# Patient Record
Sex: FEMALE | URBAN - METROPOLITAN AREA
[De-identification: names, ages, dates, MRNs, and addresses within clinical notes are randomized per-mention and may not be internally consistent; named-entity substitution may affect disease eponyms.]

---

## 2020-05-01 ENCOUNTER — OFFICE VISIT - RIVER FALLS (OUTPATIENT)
Dept: FAMILY MEDICINE | Facility: CLINIC | Age: 65
End: 2020-05-01

## 2020-05-06 ENCOUNTER — AMBULATORY - RIVER FALLS (OUTPATIENT)
Dept: FAMILY MEDICINE | Facility: CLINIC | Age: 65
End: 2020-05-06

## 2020-05-07 ENCOUNTER — COMMUNICATION - RIVER FALLS (OUTPATIENT)
Dept: FAMILY MEDICINE | Facility: CLINIC | Age: 65
End: 2020-05-07

## 2020-05-07 LAB
A/G RATIO - HISTORICAL: 1.5 (ref 1–2.5)
ALBUMIN SERPL-MCNC: 4.3 GM/DL (ref 3.6–5.1)
ALP SERPL-CCNC: 44 UNIT/L (ref 37–153)
ALT SERPL W P-5'-P-CCNC: 14 UNIT/L (ref 6–29)
AST SERPL W P-5'-P-CCNC: 19 UNIT/L (ref 10–35)
BILIRUB SERPL-MCNC: 0.5 MG/DL (ref 0.2–1.2)
BUN SERPL-MCNC: 18 MG/DL (ref 7–25)
BUN/CREAT RATIO - HISTORICAL: ABNORMAL (ref 6–22)
CALCIUM SERPL-MCNC: 10 MG/DL (ref 8.6–10.4)
CHLORIDE BLD-SCNC: 106 MMOL/L (ref 98–110)
CHOLEST SERPL-MCNC: 230 MG/DL
CHOLEST/HDLC SERPL: 3.5 {RATIO}
CO2 SERPL-SCNC: 29 MMOL/L (ref 20–32)
CREAT SERPL-MCNC: 0.77 MG/DL (ref 0.5–0.99)
EGFRCR SERPLBLD CKD-EPI 2021: 83 ML/MIN/1.73M2
ERYTHROCYTE [DISTWIDTH] IN BLOOD BY AUTOMATED COUNT: 12.7 % (ref 11–15)
GLOBULIN: 2.8 (ref 1.9–3.7)
GLUCOSE BLD-MCNC: 113 MG/DL (ref 65–99)
HCT VFR BLD AUTO: 41.3 % (ref 35–45)
HDLC SERPL-MCNC: 65 MG/DL
HGB BLD-MCNC: 14.2 GM/DL (ref 11.7–15.5)
LDLC SERPL CALC-MCNC: 142 MG/DL
MCH RBC QN AUTO: 30.5 PG (ref 27–33)
MCHC RBC AUTO-ENTMCNC: 34.4 GM/DL (ref 32–36)
MCV RBC AUTO: 88.8 FL (ref 80–100)
NONHDLC SERPL-MCNC: 165 MG/DL
PLATELET # BLD AUTO: 309 10*3/UL (ref 140–400)
PMV BLD: 10.8 FL (ref 7.5–12.5)
POTASSIUM BLD-SCNC: 4.4 MMOL/L (ref 3.5–5.3)
PROT SERPL-MCNC: 7.1 GM/DL (ref 6.1–8.1)
RBC # BLD AUTO: 4.65 10*6/UL (ref 3.8–5.1)
SODIUM SERPL-SCNC: 140 MMOL/L (ref 135–146)
TRIGL SERPL-MCNC: 111 MG/DL
WBC # BLD AUTO: 7 10*3/UL (ref 3.8–10.8)

## 2020-05-18 ENCOUNTER — COMMUNICATION - RIVER FALLS (OUTPATIENT)
Dept: FAMILY MEDICINE | Facility: CLINIC | Age: 65
End: 2020-05-18

## 2020-06-02 ENCOUNTER — COMMUNICATION - RIVER FALLS (OUTPATIENT)
Dept: FAMILY MEDICINE | Facility: CLINIC | Age: 65
End: 2020-06-02

## 2020-06-03 ENCOUNTER — AMBULATORY - RIVER FALLS (OUTPATIENT)
Dept: FAMILY MEDICINE | Facility: CLINIC | Age: 65
End: 2020-06-03

## 2020-06-03 ENCOUNTER — COMMUNICATION - RIVER FALLS (OUTPATIENT)
Dept: FAMILY MEDICINE | Facility: CLINIC | Age: 65
End: 2020-06-03

## 2020-06-04 ENCOUNTER — AMBULATORY - RIVER FALLS (OUTPATIENT)
Dept: FAMILY MEDICINE | Facility: CLINIC | Age: 65
End: 2020-06-04

## 2020-07-06 ENCOUNTER — COMMUNICATION - RIVER FALLS (OUTPATIENT)
Dept: FAMILY MEDICINE | Facility: CLINIC | Age: 65
End: 2020-07-06

## 2020-07-07 ENCOUNTER — AMBULATORY - RIVER FALLS (OUTPATIENT)
Dept: FAMILY MEDICINE | Facility: CLINIC | Age: 65
End: 2020-07-07

## 2020-07-07 ENCOUNTER — COMMUNICATION - RIVER FALLS (OUTPATIENT)
Dept: FAMILY MEDICINE | Facility: CLINIC | Age: 65
End: 2020-07-07

## 2020-07-08 ENCOUNTER — COMMUNICATION - RIVER FALLS (OUTPATIENT)
Dept: FAMILY MEDICINE | Facility: CLINIC | Age: 65
End: 2020-07-08

## 2022-02-12 VITALS
WEIGHT: 137.2 LBS | HEIGHT: 68 IN | HEART RATE: 80 BPM | SYSTOLIC BLOOD PRESSURE: 126 MMHG | DIASTOLIC BLOOD PRESSURE: 68 MMHG | BODY MASS INDEX: 20.79 KG/M2

## 2022-02-16 NOTE — TELEPHONE ENCOUNTER
---------------------  From: Dagmar Caballero RN   Sent: 6/4/2020 2:45:02 PM CDT  Subject: Prolia REMS     Indication: Prolia  (denosumab) is a prescription medicine used to treat osteoporosis in patients who:   Are at high risk for fracture, meaning patients who have had a fracture related to osteoporosis, or who have multiple risk factors for fracture   Cannot use another osteoporosis medicine or other osteoporosis medicines did not work well   The timeline for early/late injections would be 4 weeks early and any time after the 6 month kelli. If a patient receives their injection late, then the subsequent injection would be 6 months from the date that they actually received the injection    1.  When was the last injection?  October 2019  2.  Did they check with their insurance for this calendar year?  Yes  3.  Is there an order in the chart?  Yes  4.  Has the patient had dental work involving the bone in the past month or will have work in the next 6 months?  No  5.  Did you have the patient wait 15 minutes after the injection?  Yes    The following steps were completed to comply with the REMS program for Prolia:  Reviewed information in the Medication Guide and Patient Counseling Chart, including the serious risks of Prolia  and the symptoms of each risk.  Advised patient to seek prompt medical attention if they have signs or symptoms of any of the serious risks.  Provided patient with a copy of the Medication Guide and Patient Brochure.

## 2022-02-16 NOTE — NURSING NOTE
Comprehensive Intake Entered On:  5/1/2020 8:36 AM CDT    Performed On:  5/1/2020 8:29 AM CDT by Elyssa Lopez MA               Summary   Chief Complaint :   Establish care, Needs medicaitons refilled, has been out of cholesterol medication for 1 month, is due this month for Prolia injection; Verbal permission for telephone visit   Elyssa Lopez MA - 5/1/2020 8:29 AM CDT   Health Status   Tobacco Use? :   Current every day smoker   Eylssa Lopez MA - 5/1/2020 8:29 AM CDT   Consents   Consent for Immunization Exchange :   Consent Granted   Consent for Immunizations to Providers :   Consent Granted   Elyssa Lopez MA - 5/1/2020 8:29 AM CDT   Meds / Allergies   (As Of: 5/1/2020 8:36:56 AM CDT)   Allergies (Active)   No Known Medication Allergies  Estimated Onset Date:   Unspecified ; Created By:   Elyssa Lopez MA; Reaction Status:   Active ; Category:   Drug ; Substance:   No Known Medication Allergies ; Type:   Allergy ; Updated By:   Elyssa Lopez MA; Reviewed Date:   5/1/2020 8:36 AM CDT        Medication List   (As Of: 5/1/2020 8:36:56 AM CDT)   Home Meds    denosumab  :   denosumab ; Status:   Documented ; Ordered As Mnemonic:   Prolia 60 mg/mL subcutaneous solution ; Simple Display Line:   60 mg, Subcutaneous, q6 mo, 0 Refill(s) ; Catalog Code:   denosumab ; Order Dt/Tm:   5/1/2020 8:36:32 AM CDT          calcium-vitamin D  :   calcium-vitamin D ; Status:   Documented ; Ordered As Mnemonic:   Citracal Maximum + D ; Simple Display Line:   Oral, bid, 0 Refill(s) ; Catalog Code:   calcium-vitamin D ; Order Dt/Tm:   5/1/2020 8:31:14 AM CDT          fluticasone/umeclidinium/vilanterol  :   fluticasone/umeclidinium/vilanterol ; Status:   Documented ; Ordered As Mnemonic:   Trelegy Ellipta inhalation powder ; Simple Display Line:   1 puff(s), Inhale, daily, 0 Refill(s) ; Catalog Code:   fluticasone/umeclidinium/vilanterol ; Order Dt/Tm:   5/1/2020 8:31:05 AM CDT          gabapentin  :   gabapentin ; Status:    Documented ; Ordered As Mnemonic:   gabapentin 300 mg oral capsule ; Simple Display Line:   300 mg, 1 cap(s), Oral, tid, 270 cap(s), 0 Refill(s) ; Catalog Code:   gabapentin ; Order Dt/Tm:   5/1/2020 8:30:29 AM CDT          multivitamin with minerals  :   multivitamin with minerals ; Status:   Documented ; Ordered As Mnemonic:   Centrum Silver ; Simple Display Line:   Oral, daily, 0 Refill(s) ; Catalog Code:   multivitamin with minerals ; Order Dt/Tm:   5/1/2020 8:32:01 AM CDT          pravastatin  :   pravastatin ; Status:   Documented ; Ordered As Mnemonic:   pravastatin 20 mg oral tablet ; Simple Display Line:   20 mg, 1 tab(s), Oral, daily, 30 tab(s), 0 Refill(s) ; Catalog Code:   pravastatin ; Order Dt/Tm:   5/1/2020 8:30:48 AM CDT            ID Risk Screen   Recent Travel History :   No recent travel   Family Member Travel History :   No recent travel   Other Exposure to Infectious Disease :   Unknown   Elyssa Lopez MA - 5/1/2020 8:29 AM CDT

## 2022-02-16 NOTE — TELEPHONE ENCOUNTER
---------------------  From: Jaime Herrera PA-C   To: DANNY VASQUES    Sent: 7/8/2020 11:03:29 AM CDT  Subject: Patient Message - Results Notification        Please schedule an appointment with Dr. Hurtado to discuss your results.    Results:  Date Result Name Ind Value Ref Range   7/7/2020 2:43 PM Sodium Level  140 mmol/L (135 - 146)   7/7/2020 2:43 PM Potassium Level  3.9 mmol/L (3.5 - 5.3)   7/7/2020 2:43 PM Chloride Level  108 mmol/L (98 - 110)   7/7/2020 2:43 PM CO2 Level  28 mmol/L (20 - 32)   7/7/2020 2:43 PM Glucose Level ((H)) 132 mg/dL (65 - 99)   7/7/2020 2:43 PM BUN  15 mg/dL (7 - 25)   7/7/2020 2:43 PM Creatinine Level  0.80 mg/dL (0.50 - 0.99)   7/7/2020 2:43 PM Calcium Level  9.2 mg/dL (8.6 - 10.4)   7/7/2020 2:43 PM Phosphorus Level ((L)) 2.3 mg/dL (2.5 - 4.5)   7/7/2020 2:43 PM Magnesium Level  2.1 mg/dL (1.5 - 2.5)

## 2022-02-16 NOTE — TELEPHONE ENCOUNTER
---------------------  From: Erma Burgess LPN   Sent: 7/6/2020 1:19:56 PM CDT  Subject: labs     Called pt at [phone] and LM wondering what labs she was coming in for. There are orders placed by T. But on the schedule it says outside orders.     Asked for a call back to let us know if it is just T labs or if they had a lab order with them.

## 2022-02-16 NOTE — TELEPHONE ENCOUNTER
---------------------  From: Renetta Brenner CMA (eRx Pool ([facility]))   To: MELANIE Message Pool ([address]);     Sent: 10/27/2020 10:52:53 AM CDT  Subject: pravastatin refill request   Due Date/Time: 10/27/2020 7:12:00 AM CDT          Date of last office visit and reason:  5/1/20 med refill/Est care video with T    Date of last Med Check / Px:    Date of last labs pertaining to med:  5/6/20 fasting labs    Note:  Please advise on refill, pt need follow up visit?  per 5/3/20 note pt is Visiting from [place].    [facility] order in chart:  no    For protocol refill, has pt been contacted:  _      ------------------------------------------  From: [facility] #[id number]  To: Adrien Hurtado MD  Sent: October 26, 2020 7:12:03 AM CDT  Subject: Medication Management  Due: October 8, 2020 2:04:31 PM CDT     ** On Hold Pending Signature **     Dispensed Drug: pravastatin (pravastatin 20 mg oral tablet), TAKE 1 TABLET BY MOUTH DAILY  Quantity: 90 tab(s)  Days Supply: 90  Refills: 0  Substitutions Allowed  Notes from Pharmacy:  ---------------------------------------------------------------  From: Adrien Hurtado MD   To: [facility] #[id number]    Sent: 10/27/2020 12:42:08 PM CDT  Subject: pravastatin refill request     ** Submitted: **  Complete:pravastatin (pravastatin 20 mg oral tablet)   Signed by Adrien Hurtado MD  10/27/2020 5:42:00 PM [facility]    ** Approved with modifications: **  pravastatin (PRAVASTATIN 20MG TABLETS)  TAKE 1 TABLET BY MOUTH DAILY  Qty:  90 tab(s)        Days Supply:  90        Refills:  0          Substitutions Allowed     Route To Pharmacy - [facility] #[id number]

## 2022-02-16 NOTE — TELEPHONE ENCOUNTER
Entered by Kelby , April on 2020 11:08:38 AM CDT  scheduled       ---------------------  From: DANNY GARCIA  To: [facility]  Sent: 2020 10:53 a.m. CDT  Subject: Appointment Request  Patient Name: DANNY GARCIA  Patient : 1955  Appointment Dates: First Available Appointment  Preferred Days: Monday,Tuesday,Wednesday,Thursday,Friday  Preferred Time: Morning  Contact Patient by: Phone - [phone]    Reason for Visit:    Return for non fasting labs

## 2022-02-16 NOTE — TELEPHONE ENCOUNTER
---------------------  From: Dagmar Caballero RN   To: ARELI Pool ( [address]_[facility]);     Sent: 6/2/2020 2:38:31 PM CDT  Subject: Prolia injection appointment     Pt has appointment tomorrow for Prolia injection.  We do not have this medication available for patient.    I called pt at [phone] and there was no answer; mail box was full and I could not leave a message.    We need to speak to patient to see if she wants us to order Prolia through specialty pharmacy per our policy.I called the patient discuss our policy. We have cancelled todays appointment. I did discuss the shot with manager and as the patient is willing to learn self administration with teaching we will reschedule an appointment for teaching. Will confirm her storage method for this medication at her home. Message is left on her home phone to return my call to reschedule.Patient called back. She is scheduled for 6/420 for patient education on self injection of subcutaneous medication with nursing. She picked up her Rx from pharmacy on 6/2/20 and has kept it refrigerated and will do so until appointment on 6/4/20.

## 2022-02-16 NOTE — TELEPHONE ENCOUNTER
---------------------  From: Mikayla Madden RN ([employer] Pool ( [address]_[facility]))   To: Adrien Hurtado MD;     Sent: 6/3/2020 1:26:45 PM CDT  Subject: Labs     Do you want calcium and phos, or other labs done 1 -2 months post prolia injection? She will be getting her injection on 6/4/20.  ** Submitted: **  Order:Phosphate (as phosphorus)* ([facility])  Details:  Specimen Type: Serum, *Est. Collection Date: 7/4/2020 +/- 7 day(s), Future Order         Signed by Adrien Hurtado MD  6/4/2020 1:17:00 PM    ** Submitted: **  Order:Magnesium* ([facility])  Details:  Specimen Type: Serum, *Est. Collection Date: 7/4/2020 +/- 7 day(s), Future Order         Signed by Adrien Hurtado MD  6/4/2020 1:17:00 PM    ** Submitted: **  Order:Basic Metabolic Panel* ([facility])  Details:  Specimen Type: Serum, *Est. Collection Date: 7/4/2020 +/- 7 day(s), Future Order         Signed by Adrien Hurtado MD  6/4/2020 1:17:00 PM---------------------  From: Adrien Hurtado MD   To: NATIONS[employer] Pool ( [address]_[facility]);     Sent: 6/4/2020 8:22:20 AM CDT  Subject: RE: Labs     Yes please in a  month.Will inform pt at appointment today.

## 2022-02-16 NOTE — TELEPHONE ENCOUNTER
Patient:   DANNY VASQUES            MRN: [id number]            FIN: [id number]               Age:   64 years     Sex:  Female     :  1955   Associated Diagnoses:   Osteoporosis; Pure hypercholesterolemia; Neuropathy; COPD type A   Author:   Adrien Hurtado MD      Visit Information      Date of Service: 2020 07:40 am  Performing Location: [facility]  Encounter#: [id number]      Primary Care Provider (PCP):  NONE ,       Referring Provider:  Adrien Hurtado MD    NPI# [id number]   Visit type:  Telephone Encounter.    Source of history:  Patient.    Location of patient:  Home, New patient  Call Start Time:   855  Call End Time:    910         Chief Complaint   2020 8:29 AM CDT     Establish care, Needs medicaitons refilled, has been out of cholesterol medication for 1 month, is due this month for Prolia injection; Verbal permission for telephone visit   _      History of Present Illness   Today's visit was conducted via telephone due to the COVID-19 pandemic. Patient's consent to telephone visit was obtained and documented.      Reason for visit: refill meds.  Visiting from [place].  Stable on meds for COPD, Neuropathy, Osteoporosis, and cholesterol.    Will need labs before Prolia prescribed.      Review of Systems   Constitutional:  Negative.    Respiratory:  Negative.    Cardiovascular:  Negative.    Neurologic:  Negative except as documented in history of present illness.       Impression and Plan   Diagnosis     Osteoporosis (YME14-DB M81.0).     Pure hypercholesterolemia (PIR68-EB E78.00).     Neuropathy (DPU81-WR G62.9).     COPD type A (IAB21-OH J43.9).     Course:  Progressing as expected, Unchanged.    Orders     Orders (Selected)   Outpatient Orders  Future (On Hold)  CBC (h/h, RBC, indices, WBC, Plt)* ([facility]): Specimen Type: Blood, *Est. Collection Date: 20 due within 1 week(s), Future Order  Comprehensive Metabolic Panel* ([facility]): Specimen  Type: Serum, *Est. Collection Date: 05/01/20 due within 1 week(s), Future Order  Lipid panel with reflex to direct ldl* ([facility]): Specimen Type: Serum, *Est. Collection Date: 05/01/20 due within 1 week(s), Future Order  Prescriptions  Prescribed  Trelegy Ellipta inhalation powder: = 1 puff(s), Inhale, daily, # 3 EA, 1 Refill(s), Type: Maintenance, [facility]: [id number], 1 puff(s) Inhale daily  gabapentin 300 mg oral capsule: = 1 cap(s) ( 300 mg ), Oral, tid, # 270 cap(s), 1 Refill(s), Type: Maintenance, Pharmacy: [facility] #[id number], 1 cap(s) Oral tid,x90 day(s)  loratadine 10 mg oral tablet: = 1 tab(s) ( 10 mg ), Oral, daily, # 90 tab(s), 1 Refill(s), Type: Maintenance, Pharmacy: [facility] #[id number], 1 tab(s) Oral daily  pravastatin 20 mg oral tablet: = 1 tab(s) ( 20 mg ), Oral, daily, # 90 tab(s), 1 Refill(s), Type: Maintenance, Pharmacy: [facility] #[id number], 1 tab(s) Oral daily.     Will prescribe Prolia when labs back..        Health Status   Allergies:    Allergic Reactions (Selected)  No Known Medication Allergies   Medications:  (Selected)   Prescriptions  Prescribed  Trelegy Ellipta inhalation powder: = 1 puff(s), Inhale, daily, # 3 EA, 1 Refill(s), Type: Maintenance, Pharmacy: [facility] #[id number], 1 puff(s) Inhale daily  gabapentin 300 mg oral capsule: = 1 cap(s) ( 300 mg ), Oral, tid, # 270 cap(s), 1 Refill(s), Type: Maintenance, [facility]acy: [facility] #[id number], 1 cap(s) Oral tid,x90 day(s)  loratadine 10 mg oral tablet: = 1 tab(s) ( 10 mg ), Oral, daily, # 90 tab(s), 1 Refill(s), Type: Maintenance, Pharmacy: [facility] #[id number], 1 tab(s) Oral daily  pravastatin 20 mg oral tablet: = 1 tab(s) ( 20 mg ), Oral, daily, # 90 tab(s), 1 Refill(s), Type: Maintenance, Pharmacy: [facility] #[id number], 1 tab(s) Oral daily  Documented Medications  Documented  Centrum Silver: Oral, daily, 0 Refill(s), Type: Maintenance  Citracal Maximum + D: Oral, bid,  0 Refill(s), Type: Maintenance  Prolia 60 mg/mL subcutaneous solution: ( 60 mg ), Subcutaneous, q6 mo, 0 Refill(s), Type: Maintenance   Problem list:    All Problems  COPD type A / SNOMED CT 807039261 / Confirmed  Neuropathy / SNOMED CT 1201676947 / Confirmed  Osteoporosis / SNOMED CT 523475289 / Confirmed  Pure hypercholesterolemia / SNOMED CT 526830043 / Confirmed      Histories   Past Medical History:    No active or resolved past medical history items have been selected or recorded.   Family History:    No family history items have been selected or recorded.   Procedure history:    No active procedure history items have been selected or recorded.   Social History:             No active social history items have been recorded.

## 2022-02-16 NOTE — TELEPHONE ENCOUNTER
---------------------  From: Tequila Billy LPN (Phone Messages Pool ([address] [place] [address]))   To: [facility] Message Pool ([address] [place] [address]);     Sent: 7/8/2020 2:32:57 PM CDT  Subject: CONSUMER MESSAGE FW: Glucose and phosphorous levels           ---------------------  From: DANNY GARCIA  To: [facility]  Sent: 07/08/2020 02:05 p.m. CDT  Subject: Glucose and phosphorous levels  Should I be concerned on these levels?---------------------  From: Gill Hawkins CMA ([facility] Message Pool ([address] [place] [address]))   To: Jaime Herrera PA-C;     Sent: 7/8/2020 2:36:30 PM CDT  Subject: FW: CONSUMER MESSAGE FW: Glucose and phosphorous levels---------------------  From: Jaime Herrera PA-C   To: [facility] Message Pool ([facility]);     Sent: 7/8/2020 2:42:32 PM CDT  Subject: RE: CONSUMER MESSAGE FW: Glucose and phosphorous levels     Just mildly outside normal ranges. Please FU with CHT to discuss next steps.    CAL---------------------  From: Lurdes Bedolla CMA ([facility] Message Pool ([address] [place] [address]))   To: DANNY VASQUES    Sent: 7/8/2020 3:02:30 PM CDT  Subject: FW: CONSUMER MESSAGE FW: Glucose and phosphorous levels

## 2022-02-16 NOTE — TELEPHONE ENCOUNTER
---------------------  From: Kourtney Lopez (Appointment Pool ([address]_[place] [facility]))   To: DANNY VASQUES    Sent: 2020 9:03:30 AM CDT  Subject: RE: Appointment Request     Good morning!  I have you scheduled for your shot on  at 10:30am at our [place] location.  If this time doesn't work for you please reach out to us and we can help reschedule for a better time.    Thank you!  -Kourtney    ---------------------  From: DANNY GARCIA  To: [facility]  Sent: 2020 08:54 a.m. CDT  Subject: Appointment Request  Patient Name: DANNY KARMEN GARCIA  Patient : 1955  Appointment Dates: First Available Appointment  Preferred Days: Wednesday  Preferred Time: Anytime  Contact Patient by: Phone - [phone]    Reason for Visit:    Prolea shot

## 2022-02-16 NOTE — TELEPHONE ENCOUNTER
---------------------  From: Jaime Herrera PA-C   To: DANNY VASQUES    Sent: 5/7/2020 8:31:00 AM CDT  Subject: Patient Message - Results Notification          Your chemistries and blood counts are fine. Your lipids are not well controlled. Please make an appointment with your primary to discuss treatment options.  Results:  Date Result Name Ind Value Ref Range   5/6/2020 10:20 AM Sodium Level  140 mmol/L (135 - 146)   5/6/2020 10:20 AM Potassium Level  4.4 mmol/L (3.5 - 5.3)   5/6/2020 10:20 AM Chloride Level  106 mmol/L (98 - 110)   5/6/2020 10:20 AM CO2 Level  29 mmol/L (20 - 32)   5/6/2020 10:20 AM Glucose Level ((H)) 113 mg/dL (65 - 99)   5/6/2020 10:20 AM BUN  18 mg/dL (7 - 25)   5/6/2020 10:20 AM Creatinine Level  0.77 mg/dL (0.50 - 0.99)   5/6/2020 10:20 AM Calcium Level  10.0 mg/dL (8.6 - 10.4)   5/6/2020 10:20 AM Bilirubin Total  0.5 mg/dL (0.2 - 1.2)   5/6/2020 10:20 AM Alkaline Phosphatase  44 unit/L (37 - 153)   5/6/2020 10:20 AM AST/SGOT  19 unit/L (10 - 35)   5/6/2020 10:20 AM ALT/SGPT  14 unit/L (6 - 29)   5/6/2020 10:20 AM Protein Total  7.1 gm/dL (6.1 - 8.1)   5/6/2020 10:20 AM Albumin Level  4.3 gm/dL (3.6 - 5.1)   5/6/2020 10:20 AM Globulin  2.8 (1.9 - 3.7)   5/6/2020 10:20 AM A/G Ratio  1.5 (1.0 - 2.5)   5/6/2020 10:20 AM Cholesterol ((H)) 230 mg/dL ( - <200)   5/6/2020 10:20 AM Non-HDL Cholesterol ((H)) 165 ( - <130)   5/6/2020 10:20 AM HDL  65 mg/dL (> OR = 50 - )   5/6/2020 10:20 AM Cholesterol/HDL Ratio  3.5 ( - <5.0)   5/6/2020 10:20 AM LDL ((H)) 142    5/6/2020 10:20 AM Triglyceride  111 mg/dL ( - <150)   5/6/2020 10:20 AM WBC  7.0 (3.8 - 10.8)   5/6/2020 10:20 AM RBC  4.65 (3.80 - 5.10)   5/6/2020 10:20 AM Hgb  14.2 gm/dL (11.7 - 15.5)   5/6/2020 10:20 AM Hct  41.3 % (35.0 - 45.0)   5/6/2020 10:20 AM MCV  88.8 fL (80.0 - 100.0)   5/6/2020 10:20 AM MCH  30.5 pg (27.0 - 33.0)   5/6/2020 10:20 AM MCHC  34.4 gm/dL (32.0 - 36.0)   5/6/2020 10:20 AM RDW  12.7 % (11.0 - 15.0)   5/6/2020  10:20 AM Platelet  309 (140 - 400)   5/6/2020 10:20 AM MPV  10.8 fL (7.5 - 12.5)

## 2022-02-16 NOTE — NURSING NOTE
Quick Intake Entered On:  5/6/2020 10:10 AM CDT    Performed On:  5/6/2020 10:09 AM CDT by Dagmar Caballero RN               Summary   Chief Complaint :   Wt, BP   Weight Measured :   137.2 lb(Converted to: 137 lb 3 oz, 62.23 kg)    Systolic Blood Pressure :   146 mmHg (HI)    Diastolic Blood Pressure :   78 mmHg   Mean Arterial Pressure :   101 mmHg   BP Site :   Right arm   BP Method :   Manual   Dagmar Caballero RN - 5/6/2020 10:09 AM CDT

## 2022-02-16 NOTE — TELEPHONE ENCOUNTER
---------------------  From: Annalise Collins CMA (Phone Messages Pool ([address] [place] [address]))   To: [facility] Message Pool ([address] [place] [address]);     Sent: 5/18/2020 9:41:54 AM CDT  Subject: FW: Prolea shot. Prescriptions refilled.           ---------------------  From: DANNY GARCIA  To: [facility]  Sent: 05/18/2020 09:31 a.m. CDT  Subject: Prolea shot. Prescriptions refilled.  Had blood work done on 5/1/20. I need to know if I have to have Prolea shot, also I need rx refills.---------------------  From: Elyssa Lopez MA ([employer] Message Pool ([address] [place] [address]))   To: Adrien Hurtado MD;     Sent: 5/18/2020 10:46:50 AM CDT  Subject: FW: Prolea shot. Prescriptions refilled.---------------------  From: Adrien Hurtado MD   To: [employer] Message Pool ([address] [place] [address]);     Sent: 5/19/2020 9:47:22 AM CDT  Subject: RE: Prolea shot. Prescriptions refilled.     She does not appear to have been seen at the clinic before.  She needs to make an appointment with someone before her meds can be refilled.---------------------  From: Vesna Jarrett CMA ([employer] Message Pool ([address] [place] [address]))   To: Adrien Hurtado MD;     Sent: 5/19/2020 10:34:13 AM CDT  Subject: FW: Prolea shot. Prescriptions refilled.     Pt was seen on 5/1/20 for an Established Care visit via Telephone.   It appears you did refill her medications for a six month supply at that time. Prolia is noted on her med list.   Please advise.  ** Submitted: **  Order:denosumab (Prolia 60 mg/mL subcutaneous solution)  60 mg  Subcutaneous  q6 mo  supplement with calcium and vitamin D, suggested as calcium 1000 mg/day and at least 400 IU vitamin D per day  Qty:  1 EA        Refills:  1          Substitutions Allowed     [id number]    Signed by Adrien Hurtado MD  5/19/2020 4:03:00 PM

## 2022-02-16 NOTE — NURSING NOTE
Quick Intake Entered On:  5/6/2020 10:23 AM CDT    Performed On:  5/6/2020 10:16 AM CDT by Dagmar Caballero RN               Summary   Chief Complaint :   Ht, BP recheck   Height Measured :   67.5 in(Converted to: 5 ft 7 in, 171.45 cm)    Systolic Blood Pressure :   126 mmHg   Diastolic Blood Pressure :   68 mmHg   Mean Arterial Pressure :   87 mmHg   Peripheral Pulse Rate :   80 bpm   BP Site :   Right arm   Pulse Site :   Radial artery   BP Method :   Manual   HR Method :   Manual   Dagmar Caballero RN - 5/6/2020 10:22 AM CDT

## 2022-02-16 NOTE — LETTER
(Inserted Image. Unable to display)     July 06, 2020      DANNY VASQUES   [address] [place], [place] [id number]          Dear DANNY,      Thank you for selecting [facility] ([facility]viously [facility]) for your healthcare needs.      Our records indicate you are due for the following services:     Non-Fasting Labs.    If you had your labs done at another facility or with Direct Access Lab Testing at [facility], please bring in a copy of the results to your next visit, mail a copy, or drop off a copy of your results to your Healthcare Provider.      To schedule an appointment or if you have further questions, please contact your primary clinic:   [facility]       [phone]   [facility]       [phone]              [facility]     [phone]      Powered by [employer]    Sincerely,    Adrien Hurtado MD